# Patient Record
Sex: MALE | Race: OTHER | NOT HISPANIC OR LATINO | Employment: FULL TIME | URBAN - METROPOLITAN AREA
[De-identification: names, ages, dates, MRNs, and addresses within clinical notes are randomized per-mention and may not be internally consistent; named-entity substitution may affect disease eponyms.]

---

## 2023-12-27 ENCOUNTER — TELEPHONE (OUTPATIENT)
Dept: SLEEP MEDICINE | Facility: HOSPITAL | Age: 43
End: 2023-12-27
Payer: COMMERCIAL

## 2023-12-27 DIAGNOSIS — G47.33 OSA (OBSTRUCTIVE SLEEP APNEA): ICD-10-CM

## 2023-12-27 NOTE — TELEPHONE ENCOUNTER
Patient called and was able to get a DME that his insurance covers. Patient states it is [employer]. Patient requesting we send over all information to [facility] so he can begin using CPAP.    Patient scheduled for follow up visit with Dr. Ugalde on 04/11/2024.

## 2024-04-11 ENCOUNTER — APPOINTMENT (OUTPATIENT)
Dept: SLEEP MEDICINE | Facility: HOSPITAL | Age: 44
End: 2024-04-11
Payer: COMMERCIAL

## 2024-04-12 ENCOUNTER — OFFICE VISIT (OUTPATIENT)
Dept: PRIMARY CARE | Facility: CLINIC | Age: 44
End: 2024-04-12
Payer: COMMERCIAL

## 2024-04-12 VITALS
HEIGHT: 70 IN | HEART RATE: 75 BPM | BODY MASS INDEX: 21.5 KG/M2 | DIASTOLIC BLOOD PRESSURE: 73 MMHG | WEIGHT: 150.2 LBS | OXYGEN SATURATION: 97 % | SYSTOLIC BLOOD PRESSURE: 117 MMHG

## 2024-04-12 DIAGNOSIS — H60.02 BOIL, EAR, LEFT: Primary | ICD-10-CM

## 2024-04-12 PROCEDURE — 1036F TOBACCO NON-USER: CPT | Performed by: INTERNAL MEDICINE

## 2024-04-12 PROCEDURE — 99213 OFFICE O/P EST LOW 20 MIN: CPT | Performed by: INTERNAL MEDICINE

## 2024-04-12 RX ORDER — SULFAMETHOXAZOLE AND TRIMETHOPRIM 800; 160 MG/1; MG/1
1 TABLET ORAL 2 TIMES DAILY
Qty: 20 TABLET | Refills: 0 | Status: SHIPPED | OUTPATIENT
Start: 2024-04-12 | End: 2024-04-22

## 2024-04-12 RX ORDER — PREDNISONE 20 MG/1
TABLET ORAL
Qty: 15 TABLET | Refills: 0 | Status: SHIPPED | OUTPATIENT
Start: 2024-04-12 | End: 2024-05-01 | Stop reason: WASHOUT

## 2024-04-12 ASSESSMENT — ENCOUNTER SYMPTOMS: CONSTITUTIONAL NEGATIVE: 1

## 2024-04-12 ASSESSMENT — PATIENT HEALTH QUESTIONNAIRE - PHQ9
SUM OF ALL RESPONSES TO PHQ9 QUESTIONS 1 AND 2: 0
2. FEELING DOWN, DEPRESSED OR HOPELESS: NOT AT ALL
SUM OF ALL RESPONSES TO PHQ9 QUESTIONS 1 AND 2: 0
1. LITTLE INTEREST OR PLEASURE IN DOING THINGS: NOT AT ALL
2. FEELING DOWN, DEPRESSED OR HOPELESS: NOT AT ALL
1. LITTLE INTEREST OR PLEASURE IN DOING THINGS: NOT AT ALL

## 2024-04-12 NOTE — PROGRESS NOTES
"Patient ID: Alistair Chadwick Jr. is a 43 y.o. male who presents for Ear Fullness (Left ear feels full).    /73   Pulse 75   Ht 1.772 m (5' 9.75\")   Wt 68.1 kg (150 lb 3.2 oz)   SpO2 97%   BMI 21.71 kg/m²     HPI      I AM HAVING LEFT EAR FEELS CLOGGED   HAVING LEFT EAR DISCHARGE AND PAIN     NEVER HAD EAR ISSUE IN THE PAST     Subjective     Review of Systems   Constitutional: Negative.    HENT:  Positive for ear pain.    All other systems reviewed and are negative.      Objective     Physical Exam  Vitals and nursing note reviewed.   HENT:      Left Ear: Ear canal normal.      Ears:      Comments: THERE IS A BOIL   VERY TENDER   TO TOUCH WITH THE   AUROSCOPE           Lab Results   Component Value Date    WBC 7.1 06/08/2022    HGB 15.3 06/08/2022    HCT 47.8 12/16/2022    MCV 90 06/08/2022     06/08/2022           Problem List Items Addressed This Visit    None  Visit Diagnoses       Boil, ear, left    -  Primary    Relevant Medications    predniSONE (Deltasone) 20 mg tablet    sulfamethoxazole-trimethoprim (Bactrim DS) 800-160 mg tablet              A/P       BACTRIM DS 1 PILL BID FOR 10 DAYS   ADVISED NOT TO POKE YOUR EAR EITHER   WITH FINGER OT Q TIPS   PREDNISONE 20MG 1 PILL IN AM AND HALF PILL   IN PM TO TAKE WITH FOOD AND 1 GLASS OF WATER FOR 10 DAYS   FOLLOW UP WITH ME IN 1 WK TIME FOR PHYSICAL AND TO CHECK EAR ISSUE      "

## 2024-04-18 ENCOUNTER — OFFICE VISIT (OUTPATIENT)
Dept: PRIMARY CARE | Facility: CLINIC | Age: 44
End: 2024-04-18
Payer: COMMERCIAL

## 2024-04-18 ENCOUNTER — APPOINTMENT (OUTPATIENT)
Dept: PRIMARY CARE | Facility: CLINIC | Age: 44
End: 2024-04-18
Payer: COMMERCIAL

## 2024-04-18 VITALS
DIASTOLIC BLOOD PRESSURE: 52 MMHG | OXYGEN SATURATION: 97 % | HEART RATE: 74 BPM | SYSTOLIC BLOOD PRESSURE: 96 MMHG | WEIGHT: 147 LBS | BODY MASS INDEX: 21.24 KG/M2

## 2024-04-18 DIAGNOSIS — H60.02 BOIL, EAR, LEFT: Primary | ICD-10-CM

## 2024-04-18 PROCEDURE — 99212 OFFICE O/P EST SF 10 MIN: CPT | Performed by: INTERNAL MEDICINE

## 2024-04-18 PROCEDURE — 1036F TOBACCO NON-USER: CPT | Performed by: INTERNAL MEDICINE

## 2024-04-18 ASSESSMENT — ENCOUNTER SYMPTOMS: CONSTITUTIONAL NEGATIVE: 1

## 2024-04-18 NOTE — PROGRESS NOTES
Patient ID: Alistair Chadwick Jr. is a 43 y.o. male who presents for Follow-up (Left ear boil).    BP 96/52   Pulse 74   Wt 66.7 kg (147 lb)   SpO2 97%   BMI 21.24 kg/m²     HPI        PT IS HERE FOR 1 WK F/U , TO EVALUATE FOR LEFT EAR BOIL   PT PRESCRIBED BACTRIM DS 1 PILL BID FOR 10 DAYS   AND PREDNISONE 20MG 1 PILL IN AM AND HALF PILL IN PM   FOR 10 DAYS     HE IS DOING MUCH BETTER   Subjective     Review of Systems   Constitutional: Negative.    HENT:  Positive for ear pain.         LEFT EAR PAIN SIGNIFICANTLY IMPROVED    All other systems reviewed and are negative.      Objective     Physical Exam  Vitals and nursing note reviewed.   HENT:      Left Ear: Ear canal normal.      Ears:      Comments: BOIL IS MUCH IMPROVED   NO REDNESS   AND SWELLING GONE DOWN SIGNIFICANTLY         Lab Results   Component Value Date    WBC 7.1 06/08/2022    HGB 15.3 06/08/2022    HCT 47.8 12/16/2022    MCV 90 06/08/2022     06/08/2022           Problem List Items Addressed This Visit       Boil, ear, left - Primary           A/P         FINISH THE BACTRIM   FINISH THE PREDNISONE   ADVISED TO AVOID ANY POKING   WITH FINGER AND WITH QTIP   WILL SEE HIM FOR HIS PHYSICAL   SCHEDULED ON 04/26/2024

## 2024-04-26 ENCOUNTER — OFFICE VISIT (OUTPATIENT)
Dept: PRIMARY CARE | Facility: CLINIC | Age: 44
End: 2024-04-26
Payer: COMMERCIAL

## 2024-04-26 ENCOUNTER — LAB (OUTPATIENT)
Dept: LAB | Facility: LAB | Age: 44
End: 2024-04-26
Payer: COMMERCIAL

## 2024-04-26 VITALS
WEIGHT: 145.8 LBS | OXYGEN SATURATION: 97 % | BODY MASS INDEX: 21.59 KG/M2 | HEIGHT: 69 IN | HEART RATE: 83 BPM | DIASTOLIC BLOOD PRESSURE: 68 MMHG | SYSTOLIC BLOOD PRESSURE: 115 MMHG

## 2024-04-26 DIAGNOSIS — H60.02 FURUNCLE OF LEFT EAR: ICD-10-CM

## 2024-04-26 DIAGNOSIS — Z00.00 ANNUAL PHYSICAL EXAM: ICD-10-CM

## 2024-04-26 DIAGNOSIS — Z12.5 SCREENING FOR PROSTATE CANCER: ICD-10-CM

## 2024-04-26 DIAGNOSIS — K63.5 HYPERPLASTIC COLONIC POLYP, UNSPECIFIED PART OF COLON: ICD-10-CM

## 2024-04-26 DIAGNOSIS — Z00.00 ANNUAL PHYSICAL EXAM: Primary | ICD-10-CM

## 2024-04-26 LAB
ALBUMIN SERPL BCP-MCNC: 4.2 G/DL (ref 3.4–5)
ALP SERPL-CCNC: 52 U/L (ref 33–120)
ALT SERPL W P-5'-P-CCNC: 24 U/L (ref 10–52)
ANION GAP SERPL CALC-SCNC: 12 MMOL/L (ref 10–20)
AST SERPL W P-5'-P-CCNC: 23 U/L (ref 9–39)
BILIRUB SERPL-MCNC: 0.8 MG/DL (ref 0–1.2)
BUN SERPL-MCNC: 22 MG/DL (ref 6–23)
CALCIUM SERPL-MCNC: 9.4 MG/DL (ref 8.6–10.6)
CHLORIDE SERPL-SCNC: 101 MMOL/L (ref 98–107)
CHOLEST SERPL-MCNC: 181 MG/DL (ref 0–199)
CHOLESTEROL/HDL RATIO: 2.9
CO2 SERPL-SCNC: 33 MMOL/L (ref 21–32)
CREAT SERPL-MCNC: 0.87 MG/DL (ref 0.5–1.3)
EGFRCR SERPLBLD CKD-EPI 2021: >90 ML/MIN/1.73M*2
GLUCOSE SERPL-MCNC: 87 MG/DL (ref 74–99)
HDLC SERPL-MCNC: 61.8 MG/DL
LDLC SERPL CALC-MCNC: 69 MG/DL
NON HDL CHOLESTEROL: 119 MG/DL (ref 0–149)
POTASSIUM SERPL-SCNC: 4.8 MMOL/L (ref 3.5–5.3)
PROT SERPL-MCNC: 6.3 G/DL (ref 6.4–8.2)
PSA SERPL-MCNC: 0.88 NG/ML
SODIUM SERPL-SCNC: 141 MMOL/L (ref 136–145)
TRIGL SERPL-MCNC: 251 MG/DL (ref 0–149)
VLDL: 50 MG/DL (ref 0–40)

## 2024-04-26 PROCEDURE — 36415 COLL VENOUS BLD VENIPUNCTURE: CPT

## 2024-04-26 PROCEDURE — 99396 PREV VISIT EST AGE 40-64: CPT | Performed by: INTERNAL MEDICINE

## 2024-04-26 PROCEDURE — 1036F TOBACCO NON-USER: CPT | Performed by: INTERNAL MEDICINE

## 2024-04-26 PROCEDURE — 80061 LIPID PANEL: CPT

## 2024-04-26 PROCEDURE — 80053 COMPREHEN METABOLIC PANEL: CPT

## 2024-04-26 PROCEDURE — 84153 ASSAY OF PSA TOTAL: CPT

## 2024-04-26 ASSESSMENT — PATIENT HEALTH QUESTIONNAIRE - PHQ9
1. LITTLE INTEREST OR PLEASURE IN DOING THINGS: NOT AT ALL
SUM OF ALL RESPONSES TO PHQ9 QUESTIONS 1 AND 2: 0
2. FEELING DOWN, DEPRESSED OR HOPELESS: NOT AT ALL

## 2024-04-26 NOTE — PROGRESS NOTES
"Patient ID: Alistair Chadwick Jr. is a 43 y.o. male who presents for Annual Exam.    /68   Pulse 83   Ht 1.753 m (5' 9\")   Wt 66.1 kg (145 lb 12.8 oz)   SpO2 97%   BMI 21.53 kg/m²     HPI      I am having lt  ear pain   He had 2 separate course of ABX   Got better     He noted pain  and slight swelling   Inside the Auditory canal , it appears he   Is having recurrent boil /folliculitis       Pt has hx of hyperplastic colon polyps   Done twice at [place] , no blood in stool   Bowel normal, no change in bowel habit     No  FHX o ca colon     Subjective     Review of Systems   HENT:  Positive for ear pain.         Left ear pain    All other systems reviewed and are negative.      Objective     Physical Exam  Vitals and nursing note reviewed.   HENT:      Left Ear: Tympanic membrane normal.      Ears:      Comments: Left auditory canal furuncle noted   Neck:      Vascular: No carotid bruit.   Cardiovascular:      Rate and Rhythm: Normal rate and regular rhythm.      Pulses: Normal pulses.      Heart sounds: Normal heart sounds.   Pulmonary:      Effort: Pulmonary effort is normal.      Breath sounds: Normal breath sounds.   Lymphadenopathy:      Cervical: No cervical adenopathy.         Lab Results   Component Value Date    WBC 7.1 06/08/2022    HGB 15.3 06/08/2022    HCT 47.8 12/16/2022    MCV 90 06/08/2022     06/08/2022           Problem List Items Addressed This Visit       Hyperplastic colonic polyp    Relevant Orders    Colonoscopy Screening; Average Risk Patient    Furuncle of left ear     Other Visit Diagnoses       Annual physical exam    -  Primary    Relevant Orders    Comprehensive metabolic panel    Lipid panel    Screening for prostate cancer        Relevant Orders    Prostate Spec.Ag,Screen                 A/P         OFFERED  ABX PT DEFFERED   PT HAS APPT WITH ENT   REFFERAL COLONOSCOPY   CMP,LIPID,PSA   FOLLOW UP IF NOTED   "

## 2024-04-29 DIAGNOSIS — H91.92 DECREASED HEARING OF LEFT EAR: Primary | ICD-10-CM

## 2024-04-30 ENCOUNTER — CLINICAL SUPPORT (OUTPATIENT)
Dept: AUDIOLOGY | Facility: CLINIC | Age: 44
End: 2024-04-30
Payer: COMMERCIAL

## 2024-04-30 DIAGNOSIS — H93.12 TINNITUS, LEFT: ICD-10-CM

## 2024-04-30 DIAGNOSIS — H93.8X2 FULLNESS IN EAR, LEFT: Primary | ICD-10-CM

## 2024-04-30 DIAGNOSIS — H92.12 OTORRHEA, LEFT: ICD-10-CM

## 2024-04-30 PROCEDURE — 92567 TYMPANOMETRY: CPT

## 2024-04-30 PROCEDURE — 92557 COMPREHENSIVE HEARING TEST: CPT

## 2024-04-30 ASSESSMENT — PAIN - FUNCTIONAL ASSESSMENT: PAIN_FUNCTIONAL_ASSESSMENT: 0-10

## 2024-04-30 ASSESSMENT — PAIN SCALES - GENERAL: PAINLEVEL_OUTOF10: 0 - NO PAIN

## 2024-05-01 ENCOUNTER — OFFICE VISIT (OUTPATIENT)
Dept: OTOLARYNGOLOGY | Facility: HOSPITAL | Age: 44
End: 2024-05-01
Payer: COMMERCIAL

## 2024-05-01 DIAGNOSIS — H61.899 DEBRIS IN EAR CANAL: Primary | ICD-10-CM

## 2024-05-01 PROCEDURE — 1036F TOBACCO NON-USER: CPT | Performed by: NURSE PRACTITIONER

## 2024-05-01 PROCEDURE — 99213 OFFICE O/P EST LOW 20 MIN: CPT | Performed by: NURSE PRACTITIONER

## 2024-05-01 PROCEDURE — 99203 OFFICE O/P NEW LOW 30 MIN: CPT | Performed by: NURSE PRACTITIONER

## 2024-05-01 RX ORDER — TADALAFIL 5 MG/1
TABLET ORAL
COMMUNITY
Start: 2023-01-09

## 2024-05-01 RX ORDER — PHENYLPROPANOLAMINE/CLEMASTINE 75-1.34MG
TABLET, EXTENDED RELEASE ORAL
COMMUNITY

## 2024-05-01 RX ORDER — MULTIVITAMIN/IRON/FOLIC ACID 18MG-0.4MG
TABLET ORAL
COMMUNITY

## 2024-05-01 ASSESSMENT — PATIENT HEALTH QUESTIONNAIRE - PHQ9
1. LITTLE INTEREST OR PLEASURE IN DOING THINGS: NOT AT ALL
2. FEELING DOWN, DEPRESSED OR HOPELESS: NOT AT ALL
SUM OF ALL RESPONSES TO PHQ9 QUESTIONS 1 & 2: 0

## 2024-05-01 ASSESSMENT — ENCOUNTER SYMPTOMS
LOSS OF SENSATION IN FEET: 0
DEPRESSION: 0
OCCASIONAL FEELINGS OF UNSTEADINESS: 0

## 2024-05-01 NOTE — LETTER
May 1, 2024     Patient: Alistair Chadwick   YOB: 1980   Date of Visit: 5/1/2024       To Whom It May Concern:    Alistair Chadwick was seen in my clinic on 5/1/2024 at 11:40 am. Please excuse Alistair for his absence from work on this day to make the appointment.    If you have any questions or concerns, please don't hesitate to call.         Sincerely,         Arlyn Sweet, APRN-CNP        CC: No Recipients

## 2024-05-01 NOTE — PROGRESS NOTES
AUDIOLOGIC EVALUATION      Name:  Alistair Chadwick  :  1980  Age:  43 y.o.  Date of Evaluation:  2024    Time: 8298-7514    HISTORY:  Alistair Chadwick, 43 y.o., was seen for an audiologic assessment. He reported the onset of left otorrhea, aural fullness, pulsatile tinnitus, and reduced hearing sensitivity in March. He was prescribed several rounds of antibiotics which resolved his otologic symptoms. However, the left otologic symptoms return once the antibiotics are completed. He noted that previous medical evaluation revealed an infected hair follicle that may be the cause of his issue. He denied otalgia, dizziness, history of noise exposure, history of otologic surgeries, family history of congenital hearing loss, and recent falls.       RESULTS:  Otoscopic Evaluation:  Right Ear: Unremarkable  Left Ear: Dried drainage on tympanic membrane    Immittance Measures:  Right Ear: Type A -- indicating normal volume, pressure, and static compliance  Left Ear: Type As -- indicating normal volume, pressure, and reduced tympanic membrane compliance    Acoustic Reflexes:  Right Ear: Could not test as hermetic seal could not be maintained.  Left Ear: Could not test as hermetic seal could not be maintained.    Pure Tone Audiometry:    Right Ear: Hearing within normal limits 125-8000 Hz  Left Ear: Hearing within normal limits 125-8000 Hz, note conductive component at 4000 Hz    Asymmetry: Yes, left ear poorer 4000 and 6000 Hz    No previous audiogram for comparison.    Reliability:   Good    Speech Audiometry:   Right: Speech Reception Threshold (SRT) was obtained at 10 dBHL.   SRT/PTA in Good agreement with pure tone average.    Left: Speech Reception Threshold (SRT) was obtained at 0 dBHL.   SRT/PTA in Good agreement with pure tone average.    Word Recognition Scores (WRS):  Right Ear: excellent (100%) in quiet when words were presented at 50 dBHL.   Left Ear: excellent (100%) in quiet when words were presented at  50 dBHL.     Asymmetry: No      IMPRESSIONS:  Today's testing revealed normal ear canal volume, middle ear pressure, and tympanic membrane compliance in the right ear. He has normal ear canal volume, middle ear pressure, and reduced tympanic membrane compliance in the left ear. He has hearing within normal limits and excellent word recognition scores in both ears. Although, he has a conductive component at 4000 Hz in the left ear and an asymmetry (left ear poorer), likely due to the dried drainage on the tympanic membrane. The results were discussed with the patient. He should follow-up with ENT as scheduled for further evaluation of his persistent otologic symptoms, left ear conductive component, and noted asymmetry.       RECOMMENDATIONS:  1.) Continue medical follow up with Ears Nose and Throat (ENT) provider as recommended.  2.) Return for audiologic evaluation in conjunction with medical management to monitor hearing sensitivity and assess middle ear status or sooner should concerns arise.      Analia Brito, CCC-A  Clinical Audiologist        KEY  SNHL Sensorineural Hearing Loss   CHL Conductive Hearing Loss   MHL Mixed Hearing Loss   SSNHL Sudden Sensorineural Hearing Loss   WNL Within Normal Limits   PTA Pure Tone Average   TM Tympanic Membrane   ECV Ear Canal Volume   SRT Speech Reception Threshold   WRS Word Recognition Score

## 2024-05-01 NOTE — PROGRESS NOTES
24  Alistair Chadwick is a 43 y.o. year old male patient with   referred for  .      : 1980    HPI:  Alistair Chadwick is a 43-year-old who is coming to be seen for recent ear infections.  Patient states in early 2024 reduced hearing, drainage and a feeling of fullness in his left ear.  He was seen in urgent care where he was prescribed antibiotics; completion of course symptoms did improve if not resolved.  H however the symptoms did return rather quickly.  He was seen by his PCP who did prescribe another round of antibiotics along with steroids.  Upon the exam with his PCP it was noted that his symptoms may be related to an infected hair follicle.  He was then recommended to see an ENT.  Prior to today's exam he did undergo audiogram which did express normal hearing.  Please see audiology report for more detailed findings.    ROS:  Unless mentioned in the HPI, all other systems have been reviewed and ar negative.      Physical Exam:  Well appearing individual in no acute distress.  No stertor and no Stridor.  Good voice.  No LAD.  Skin is soft and supple. Oral cavity and oropharynx are without lesions.  No thyromegaly.  Anterior rhinoscopy reveals normal nasal mucosa bilaterally.  Right EAC normal AU with YAMILETH. Left EAC with multiple hard crusted cerumen/debris. Cranial Nerves grossly intact.    Audiogram Impression 2024:  Testing revealed normal ear canal volume, middle ear pressure, and tympanic membrane compliance in the right ear. He has normal ear canal volume, middle ear pressure, and reduced tympanic membrane compliance in the left ear. He has hearing within normal limits and excellent word recognition scores in both ears. Although, he has a conductive component at 4000 Hz in the left ear and an asymmetry (left ear poorer), likely due to the dried drainage on the tympanic membrane. The results were discussed with the patient. He should follow-up with ENT as scheduled for further evaluation  of his persistent otologic symptoms, left ear conductive component, and noted asymmetry.       Assessment:   43-year-old male with dried/crusted cerumen/debris in left ear canal.  Patient has been referred to my colleague Smooth Stevenson CNP for removal of crusted debris due to the amount of crusting. Removal at my clinic today would be extremely painful due to how tightly adhered things are.     -Instructed to use a few drops of baby oil tonight and tomorrow morning to help soften  and prepare for removal.  -Follow up with OCTAVIA Stevenson at 2PM on 5/2/2024 at [place].    VALENTINA Ballesteros-OCTAVIA          Ectopic pregnancy

## 2024-05-01 NOTE — LETTER
May 1, 2024     Patient: Alistair Chadwick   YOB: 1980   Date of Visit: 5/1/2024       To Whom It May Concern:    Alistair Chadwick will have an appointment with EVELYNE Stevenson, nurse practitioner, for  additional ear procedure on  5/2/2024 at 2:00PM. Please excuse Alistair for his absence from work on this day to make his appointment.    If you have any questions or concerns, please don't hesitate to call.         Sincerely,         Arlyn Sweet, VALENTINA-CNP        CC: No Recipients

## 2024-05-01 NOTE — PATIENT INSTRUCTIONS
Licha Matamoros to the clinic of Arlyn Sweet CNP. We are here to assist you through your ENT care at [facility].    My office number is [phone]. This number is the most direct way to communicate with the office. Dalila is my  and she answers the office phone from 8am-4pm Mon-Fri. She can help you with many general questions and information. Questions that she cannot answer will be directed appropriately to me. You may need to leave a message. In this case, someone from the team will call you back.    Sometimes other team members will also be involved in your care. This may include dieticians, social workers, speech therapists, medical oncologist or radiation oncologists. I will provide these referrals as needed. Please let me know if you would like to request a specific referral.    I look forward to working with you to meet your healthcare goals.     Plan:  -Instructed to use a few drops of baby oil tonight and tomorrow morning to help soften  and prepare for removal.  -Follow up with OCTAVIA Stevenson at 2PM on 5/2/2024 at [place].

## 2024-05-02 ENCOUNTER — OFFICE VISIT (OUTPATIENT)
Dept: OTOLARYNGOLOGY | Facility: CLINIC | Age: 44
End: 2024-05-02
Payer: COMMERCIAL

## 2024-05-02 VITALS — WEIGHT: 146.4 LBS | HEIGHT: 70 IN | BODY MASS INDEX: 20.96 KG/M2 | TEMPERATURE: 97.8 F

## 2024-05-02 DIAGNOSIS — H61.22 IMPACTED CERUMEN OF LEFT EAR: Primary | ICD-10-CM

## 2024-05-02 PROCEDURE — 1036F TOBACCO NON-USER: CPT | Performed by: NURSE PRACTITIONER

## 2024-05-02 PROCEDURE — 69210 REMOVE IMPACTED EAR WAX UNI: CPT | Performed by: NURSE PRACTITIONER

## 2024-05-02 ASSESSMENT — PATIENT HEALTH QUESTIONNAIRE - PHQ9
1. LITTLE INTEREST OR PLEASURE IN DOING THINGS: NOT AT ALL
2. FEELING DOWN, DEPRESSED OR HOPELESS: NOT AT ALL
SUM OF ALL RESPONSES TO PHQ9 QUESTIONS 1 AND 2: 0

## 2024-05-02 NOTE — PROGRESS NOTES
Subjective   Patient ID: Alistair Chadwick is a 43 y.o. male who presents for left ear blockage.  HPI  Left ear cleaning only. Used debrox yesterday.  Referred by Angelica Sweet CNP.  Patient reports that he has had ear infection and after everything healed he had residual debris in the ear canal.  He saw Tanesha and yesterday and was diagnosed with adherent cerumen debris in the canal.  Removing the debris yesterday would have posed a risk for bleeding therefore patient was advised to use Debrox drops and then follow-up today for ear cleaning.      Review of system negative except for mentioned in the HPI.  Objective   Physical Exam  CONSTITUTIONAL: No acute distress, normal facial features; No fever; no chills  VOICE: No hoarseness or other audible abnormality  RESPIRATION: Breathing comfortably, no stridor; normal breathing effort  CV: No cyanosis visible on the face and neck area  EYES:Pupils equal and round ; no erythema; conjunctiva clear; sclera white  NEURO: Alert and oriented, able to raise eyebrows symmetrical bilateral, smile with no facial droop, able to swallow  HEAD AND FACE: Symmetric facial features, no masses or lesions    Right ear examination: External ear normal.  EAC is clear.  TM intact with no effusion, retraction or perforation.  Left ear examination: External ear normal.  EAC with jack-colored cerumen adherent to the TM.    NOSE: External nose midline  ORAL CAVITY: No lesions of external lips; uvula is midline; tongue with good mobility; no gross mass in oral cavity; mucosa appears pink   NECK/LYMPH: No obvious deformity or lesions; trachea is midline  PSYCH: Alert and oriented with appropriate mood and affect.    Patient ID: Alistair Chadwick is a 43 y.o. male.    Procedures  Using microscope, #4 speculum, right ankle follow-up, alligator and #7 suction the cerumen was removed from the ear canal.  After cerumen removal tympanic membrane was inspected appears to be intact with no effusion,  retraction or perforation.      Assessment/Plan       1. Impacted cerumen of left ear          Cerumen removal was performed today.  Patient tolerated procedure well.  After cleaning patient stated that he can hear better.  Advised to follow-up in 6 months for ear check and repeat ear cleaning.    This note was created using speech recognition transcription software. Despite proofreading, several typographical errors might be present that might affect the meaning of the content. Please call with any questions.  .         VALENTINA Sweeney-CNP 05/02/24 3:49 PM

## 2024-07-17 ENCOUNTER — TELEPHONE (OUTPATIENT)
Dept: SLEEP MEDICINE | Facility: HOSPITAL | Age: 44
End: 2024-07-17
Payer: COMMERCIAL

## 2024-07-17 NOTE — TELEPHONE ENCOUNTER
Pt having problems with PAP supplies via [employer], not covered by insurance. Called [employer] and sent over previous H&P prior to sleep study as requested.

## 2024-07-25 ENCOUNTER — APPOINTMENT (OUTPATIENT)
Dept: AUDIOLOGY | Facility: CLINIC | Age: 44
End: 2024-07-25
Payer: COMMERCIAL

## 2024-07-25 ENCOUNTER — APPOINTMENT (OUTPATIENT)
Dept: OTOLARYNGOLOGY | Facility: CLINIC | Age: 44
End: 2024-07-25
Payer: COMMERCIAL

## 2024-08-14 DIAGNOSIS — K63.5 POLYP OF COLON, UNSPECIFIED PART OF COLON, UNSPECIFIED TYPE: Primary | ICD-10-CM

## 2024-08-14 RX ORDER — POLYETHYLENE GLYCOL 3350, SODIUM SULFATE ANHYDROUS, SODIUM BICARBONATE, SODIUM CHLORIDE, POTASSIUM CHLORIDE 236; 22.74; 6.74; 5.86; 2.97 G/4L; G/4L; G/4L; G/4L; G/4L
POWDER, FOR SOLUTION ORAL
Qty: 4000 ML | Refills: 0 | Status: SHIPPED | OUTPATIENT
Start: 2024-08-14

## 2024-08-22 ENCOUNTER — APPOINTMENT (OUTPATIENT)
Dept: GASTROENTEROLOGY | Facility: EXTERNAL LOCATION | Age: 44
End: 2024-08-22
Payer: COMMERCIAL

## 2024-08-22 ENCOUNTER — APPOINTMENT (OUTPATIENT)
Dept: SLEEP MEDICINE | Facility: HOSPITAL | Age: 44
End: 2024-08-22
Payer: COMMERCIAL

## 2024-08-29 ENCOUNTER — OFFICE VISIT (OUTPATIENT)
Dept: SLEEP MEDICINE | Facility: HOSPITAL | Age: 44
End: 2024-08-29
Payer: COMMERCIAL

## 2024-08-29 VITALS
HEART RATE: 88 BPM | BODY MASS INDEX: 21.98 KG/M2 | TEMPERATURE: 98 F | OXYGEN SATURATION: 99 % | WEIGHT: 151 LBS | DIASTOLIC BLOOD PRESSURE: 84 MMHG | SYSTOLIC BLOOD PRESSURE: 123 MMHG

## 2024-08-29 DIAGNOSIS — G47.33 OSA (OBSTRUCTIVE SLEEP APNEA): Primary | ICD-10-CM

## 2024-08-29 DIAGNOSIS — Z72.820 SLEEP DEPRIVATION: ICD-10-CM

## 2024-08-29 DIAGNOSIS — F51.12 INSUFFICIENT SLEEP SYNDROME: ICD-10-CM

## 2024-08-29 PROCEDURE — 1036F TOBACCO NON-USER: CPT | Performed by: STUDENT IN AN ORGANIZED HEALTH CARE EDUCATION/TRAINING PROGRAM

## 2024-08-29 PROCEDURE — 99214 OFFICE O/P EST MOD 30 MIN: CPT | Performed by: STUDENT IN AN ORGANIZED HEALTH CARE EDUCATION/TRAINING PROGRAM

## 2024-08-29 PROCEDURE — 99214 OFFICE O/P EST MOD 30 MIN: CPT | Mod: GC | Performed by: STUDENT IN AN ORGANIZED HEALTH CARE EDUCATION/TRAINING PROGRAM

## 2024-08-29 ASSESSMENT — LIFESTYLE VARIABLES
AUDIT-C TOTAL SCORE: 3
SKIP TO QUESTIONS 9-10: 1
HOW OFTEN DO YOU HAVE A DRINK CONTAINING ALCOHOL: 2-3 TIMES A WEEK
HOW MANY STANDARD DRINKS CONTAINING ALCOHOL DO YOU HAVE ON A TYPICAL DAY: 1 OR 2
HOW OFTEN DO YOU HAVE SIX OR MORE DRINKS ON ONE OCCASION: NEVER

## 2024-08-29 ASSESSMENT — PAIN SCALES - GENERAL: PAINLEVEL: 0-NO PAIN

## 2024-08-29 NOTE — PROGRESS NOTES
Patient: Alistair Chdawick    [id number]  : 1980 -- AGE 44 y.o.    Provider: Florence Leal MD     Location [facility]   Service Date: 2024              [facility]  Followup Visit Note      HISTORY OF PRESENT ILLNESS     The patient's referring provider is: No ref. provider found    HISTORY OF PRESENT ILLNESS   Alistair Chadwick is a 44 y.o. male who presents to a [facility] for followup.     The patient  has a past medical history of Personal history of colonic polyps, Personal history of other diseases of the musculoskeletal system and connective tissue, and Personal history of other diseases of the nervous system and sense organs..    PAST SLEEP HISTORY  HSAT showed mod FRANKLIN with AHI ~ 25; very positional. significant apneic episodes when supine  Ordered APAP 6-13 cwp via [facility], educated pt on FRANKLIN and PAP therapy education as well as the tips to be successful with PAP treatment. Pt was lost to follow up.   Pt sleep 5-6 hours per night and has high ESS score. Encouraged to increase sleep total to 7-9hrs a night    CURRENT HISTORY    On today's visit, the patient reports he is not using his PAP yet. He was able to obtain a machine and supplies through [employer] in 2024 but has not turned on the machine yet. He reports he has been very busy with home and work obligations. He continues to allow himself to sleep 5-6 hours per night, his ESS is 24/24. He drinks a lot of caffeinated drinks to stay alert.     Sleep Schedule: He goes to bed at 12 and his sleep latency is 10min. He wakes by  5am. He has 0 awakenings per night that last for 0 minutes. He gets about 5 hours of sleep per night.    PAP Adherence:  DURABLE MEDICAL EQUIPMENT COMPANY: [employer]  Machine: THERAPY: AUTO-PAP PRESSURE SETTINGS: 5 - 15 cm H2O  Mask: unknown  Issues with therapy: ISSUES WITH THERAPY: n/a  Benefits with PAP: n/a      RLS Followup:   SPPRLSFUSX:  none.      Daytime Symptoms    Patient report some daytime symptoms including: DAYTIME SYMPTOMS: excessive daytime sleepiness irritability during the day difficulty with memory or concentration during the day feeling sleepy when driving fatigue especially towards afternoon    Naps:   no.   Fatigue: symptoms bothersome, but easily able to carry out all usual work/school/family activities    ESS: 24/24  LUCY 14/28  FOSQ 26      REVIEW OF SYSTEMS     REVIEW OF SYSTEMS  +fatigue, sleepiness  Denies multiple awakenings    ALLERGIES AND MEDICATIONS     ALLERGIES  No Known Allergies    MEDICATIONS: He has a current medication list which includes the following prescription(s): ibuprofen - Take by mouth, centrum - Take by mouth, polyethylene glycol - Drink 1/2 starting at 6 pm the night before your procedure then drink the 2nd 1/2 5 hours before procedure arrival time, and tadalafil - Take by mouth.    PAST MEDICAL HISTORY : He  has a past medical history of Personal history of colonic polyps, Personal history of other diseases of the musculoskeletal system and connective tissue, and Personal history of other diseases of the nervous system and sense organs.    PAST SURGICAL HISTORY: He  has a past surgical history that includes Other surgical history (06/08/2022) and Other surgical history (06/08/2022).     FAMILY HISTORY: No changes since previous visit. Otherwise non-contributory as charted.       SOCIAL HISTORY  He  reports that he has never smoked. He has never used smokeless tobacco. He reports that he does not currently use alcohol after a past usage of about 14.0 standard drinks of alcohol per week. He reports that he does not use drugs.       PHYSICAL EXAM     VITAL SIGNS: /84   Pulse 88   Temp 36.7 °C (98 °F)   Wt 68.5 kg (151 lb)   SpO2 99% Comment: TANJA  BMI 21.98 kg/m²      CURRENT WEIGHT: [unfilled]  BMI: [unfilled]  PREVIOUS WEIGHTS:  Wt Readings from Last 3 Encounters:   08/29/24 68.5 kg (151 lb)   05/02/24  "66.4 kg (146 lb 6.4 oz)   04/26/24 66.1 kg (145 lb 12.8 oz)       Physical Exam  General: well appearing, no acute distress  ENT: No nasopharyngeal edema, turbinate hypertrophy or deviated septum. No retrognathia. No tongue scalloping. Uvula midline. Mallampati 3.  Neck: Supple, soft, no LAD, no thyromegaly  Cardiac: RSR  Resp: normal resp effort  Neuro: alert, gait normal      RESULTS/DATA     No results found for: \"IRON\", \"TRANSFERRIN\", \"IRONSAT\", \"TIBC\", \"FERRITIN\"    PAP Adherence  Has the machine and supplies, Has not started therapy.     ASSESSMENT/PLAN     Mr. Chadwick is a 44 y.o. male and  has a past medical history of Personal history of colonic polyps, Personal history of other diseases of the musculoskeletal system and connective tissue, and Personal history of other diseases of the nervous system and sense organs. He returns in followup to the [facility] for their FRANKLIN. Patient was lost ot follow up since 2022 and has not made any changes to his sleep patterns/sleep hygiene and has not started treatment for CPAP. Counseled on importance of FRANKLIN treatment despite life's stressors and interferences. Agreed to start the CPAP tonight and communicate with us via [employer] 2 weeks after use so we can review his download report. Will call pt if no update.     Problem List and Orders  Problem List Items Addressed This Visit    None  Visit Diagnoses         Codes    FRANKLIN (obstructive sleep apnea)    -  Primary G47.33    Sleep deprivation     Z72.820    Insufficient sleep syndrome     F51.12            Recommendations/Plan:  -Counseled on importance of FRANKLIN treatment despite life's stressors and interferences.   -Agreed to start the CPAP tonight and communicate with us via [employer] 2 weeks after use so we can review his download report. Will call pt if no update.   - Avoid driving when sleepy  -Avoid alcohol  - increase sleep duration by going to bed 2 hours earlier                 "

## 2024-08-29 NOTE — PATIENT INSTRUCTIONS
Start using your CPAP. Call us or send us a message via [employer] to let us know you used the machine for 2 weeks so we can download your data from [employer]. Start going to bed earlier to increase your sleep time. Avoid driving when sleepy.  Follow in clinic with  in 4 months.

## 2024-10-03 ENCOUNTER — TELEPHONE (OUTPATIENT)
Dept: SLEEP MEDICINE | Facility: HOSPITAL | Age: 44
End: 2024-10-03
Payer: COMMERCIAL

## 2024-10-03 NOTE — TELEPHONE ENCOUNTER
Called pt at 1815 today and left a voicemail. Downloaded patient's CPAP compliance report for September when patient started to use it regularly. Excellent response to therapy from respiratory event control standpoint. We would like to discuss his changes in his symptoms and follow up on other technical aspects in the clinic in the near future.

## 2024-10-07 DIAGNOSIS — K63.5 POLYP OF COLON, UNSPECIFIED PART OF COLON, UNSPECIFIED TYPE: ICD-10-CM

## 2024-10-07 RX ORDER — POLYETHYLENE GLYCOL 3350, SODIUM SULFATE ANHYDROUS, SODIUM BICARBONATE, SODIUM CHLORIDE, POTASSIUM CHLORIDE 236; 22.74; 6.74; 5.86; 2.97 G/4L; G/4L; G/4L; G/4L; G/4L
POWDER, FOR SOLUTION ORAL
Qty: 4000 ML | Refills: 0 | Status: SHIPPED | OUTPATIENT
Start: 2024-10-07

## 2024-10-17 ENCOUNTER — APPOINTMENT (OUTPATIENT)
Dept: GASTROENTEROLOGY | Facility: EXTERNAL LOCATION | Age: 44
End: 2024-10-17
Payer: COMMERCIAL

## 2024-10-17 DIAGNOSIS — K63.5 HYPERPLASTIC COLONIC POLYP, UNSPECIFIED PART OF COLON: ICD-10-CM

## 2024-10-17 DIAGNOSIS — D12.2 BENIGN NEOPLASM OF ASCENDING COLON: ICD-10-CM

## 2024-10-17 DIAGNOSIS — D12.3 BENIGN NEOPLASM OF TRANSVERSE COLON: ICD-10-CM

## 2024-10-17 DIAGNOSIS — Z12.11 COLON CANCER SCREENING: Primary | ICD-10-CM

## 2024-10-17 DIAGNOSIS — Z86.0100 HX OF COLONIC POLYPS: ICD-10-CM

## 2024-10-17 PROCEDURE — 45380 COLONOSCOPY AND BIOPSY: CPT | Performed by: INTERNAL MEDICINE

## 2024-10-17 PROCEDURE — 45385 COLONOSCOPY W/LESION REMOVAL: CPT | Performed by: INTERNAL MEDICINE

## 2024-10-18 ENCOUNTER — LAB REQUISITION (OUTPATIENT)
Dept: LAB | Facility: HOSPITAL | Age: 44
End: 2024-10-18
Payer: COMMERCIAL

## 2024-10-18 PROCEDURE — 0753T DGTZ GLS MCRSCP SLD LEVEL IV: CPT

## 2024-10-18 PROCEDURE — 88305 TISSUE EXAM BY PATHOLOGIST: CPT | Performed by: PATHOLOGY

## 2024-10-18 PROCEDURE — 88305 TISSUE EXAM BY PATHOLOGIST: CPT

## 2024-10-28 LAB
LABORATORY COMMENT REPORT: NORMAL
PATH REPORT.FINAL DX SPEC: NORMAL
PATH REPORT.GROSS SPEC: NORMAL
PATH REPORT.RELEVANT HX SPEC: NORMAL
PATH REPORT.TOTAL CANCER: NORMAL

## 2025-01-09 ENCOUNTER — APPOINTMENT (OUTPATIENT)
Dept: SLEEP MEDICINE | Facility: HOSPITAL | Age: 45
End: 2025-01-09
Payer: COMMERCIAL

## 2025-01-09 ENCOUNTER — OFFICE VISIT (OUTPATIENT)
Dept: SLEEP MEDICINE | Facility: HOSPITAL | Age: 45
End: 2025-01-09
Payer: COMMERCIAL

## 2025-01-09 VITALS
OXYGEN SATURATION: 98 % | WEIGHT: 163 LBS | BODY MASS INDEX: 23.73 KG/M2 | TEMPERATURE: 98 F | SYSTOLIC BLOOD PRESSURE: 128 MMHG | HEART RATE: 80 BPM | DIASTOLIC BLOOD PRESSURE: 74 MMHG

## 2025-01-09 DIAGNOSIS — G47.33 OSA (OBSTRUCTIVE SLEEP APNEA): Primary | ICD-10-CM

## 2025-01-09 DIAGNOSIS — F51.12 INSUFFICIENT SLEEP SYNDROME: ICD-10-CM

## 2025-01-09 DIAGNOSIS — Z72.820 SLEEP DEPRIVATION: ICD-10-CM

## 2025-01-09 PROCEDURE — 1036F TOBACCO NON-USER: CPT | Performed by: STUDENT IN AN ORGANIZED HEALTH CARE EDUCATION/TRAINING PROGRAM

## 2025-01-09 PROCEDURE — 99214 OFFICE O/P EST MOD 30 MIN: CPT | Performed by: STUDENT IN AN ORGANIZED HEALTH CARE EDUCATION/TRAINING PROGRAM

## 2025-01-09 ASSESSMENT — PAIN SCALES - GENERAL: PAINLEVEL_OUTOF10: 0-NO PAIN

## 2025-01-09 ASSESSMENT — SLEEP AND FATIGUE QUESTIONNAIRES
HOW LIKELY ARE YOU TO NOD OFF OR FALL ASLEEP WHEN YOU ARE A PASSENGER IN A CAR FOR AN HOUR WITHOUT A BREAK: HIGH CHANCE OF DOZING
HOW LIKELY ARE YOU TO NOD OFF OR FALL ASLEEP WHILE LYING DOWN TO REST IN THE AFTERNOON WHEN CIRCUMSTANCES PERMIT: HIGH CHANCE OF DOZING
SITING INACTIVE IN A PUBLIC PLACE LIKE A CLASS ROOM OR A MOVIE THEATER: SLIGHT CHANCE OF DOZING
HOW LIKELY ARE YOU TO NOD OFF OR FALL ASLEEP WHILE WATCHING TV: MODERATE CHANCE OF DOZING
SLEEP_PROBLEM_NOTICEABLE_TO_OTHERS: MUCH
ESS-CHAD TOTAL SCORE: 17
WORRIED_DISTRESSED_DUE_TO_SLEEP: SOMEWHAT
HOW LIKELY ARE YOU TO NOD OFF OR FALL ASLEEP IN A CAR, WHILE STOPPED FOR A FEW MINUTES IN TRAFFIC: SLIGHT CHANCE OF DOZING
HOW LIKELY ARE YOU TO NOD OFF OR FALL ASLEEP WHILE SITTING AND READING: HIGH CHANCE OF DOZING
HOW LIKELY ARE YOU TO NOD OFF OR FALL ASLEEP WHILE SITTING AND TALKING TO SOMEONE: SLIGHT CHANCE OF DOZING
HOW LIKELY ARE YOU TO NOD OFF OR FALL ASLEEP WHILE SITTING QUIETLY AFTER LUNCH WITHOUT ALCOHOL: HIGH CHANCE OF DOZING
SATISFACTION_WITH_CURRENT_SLEEP_PATTERN: SATISFIED
SLEEP_PROBLEM_INTERFERES_DAILY_ACTIVITIES: MUCH

## 2025-01-09 ASSESSMENT — ENCOUNTER SYMPTOMS
CARDIOVASCULAR NEGATIVE: 1
RESPIRATORY NEGATIVE: 1
CONSTITUTIONAL NEGATIVE: 1
PSYCHIATRIC NEGATIVE: 1
NEUROLOGICAL NEGATIVE: 1

## 2025-01-09 NOTE — PROGRESS NOTES
Patient: Alistair Chadwick    [id number]  : 1980 -- AGE 44 y.o.    Provider: Luis Alberto Ugalde MD     Location [facility]   Service Date: 2025              [facility]  Followup Visit Note        ASSESSMENT/PLAN     Mr. Chadwick is a 44 y.o. male and he returns in followup to the [facility] for the problems listed below on 25     Problem List, Orders, Assessment, Recommendations:  Problem List Items Addressed This Visit             ICD-10-CM    FRANKLIN (obstructive sleep apnea) - Primary G47.33     - doing well with PAP therapy, but could use sleep and usage extension  - continue current setting  - renew PAP supply orders           Relevant Orders    Positive Airway Pressure (PAP) Therapy    Sleep deprivation Z72.820    Insufficient sleep syndrome F51.12     Excessive daytime sleepiness is likely due to lack of sleep    Emphasized the need for adequate sleep          Disposition  Return to clinic in 6 months       HISTORY OF PRESENT ILLNESS     HISTORY OF PRESENT ILLNESS   Alistair Chadwick is a 44 y.o. male with h/o FRANKLIN who presents to a [facility] for followup.     Assessment and plan from last visit: 24    Mr. Chadwick is a 44 y.o. male and  has a past medical history of Personal history of colonic polyps, Personal history of other diseases of the musculoskeletal system and connective tissue, and Personal history of other diseases of the nervous system and sense organs. He returns in followup to the [facility] for their FRANKLIN. Patient was lost ot follow up since  and has not made any changes to his sleep patterns/sleep hygiene and has not started treatment for CPAP. Counseled on importance of FRANKLIN treatment despite life's stressors and interferences. Agreed to start the CPAP tonight and communicate with us via [employer] 2 weeks after use so we can review his  download report. Will call pt if no update.      Problem List and Orders  Problem List Items Addressed This Visit    None  Visit Diagnoses           Codes     FRANKLIN (obstructive sleep apnea)    -  Primary G47.33     Sleep deprivation     Z72.820     Insufficient sleep syndrome     F51.12                Recommendations/Plan:  -Counseled on importance of FRANKLIN treatment despite life's stressors and interferences.   -Agreed to start the CPAP tonight and communicate with us via [employer] 2 weeks after use so we can review his download report. Will call pt if no update.   - Avoid driving when sleepy  -Avoid alcohol  - increase sleep duration by going to bed 2 hours earlier       Current History    On today's visit, the patient reports that he has been feeling better with PAP therapy.  He feels that his sleep quality is better with PAP therapy.  He, however, takes on two jobs and often doesn't go to bed until MN.  His avg sleep time is about 5.5 hr which corresponds to his usage on CPAP.  He has kids and a lot of stuff that needs to be done, and may be the reason why he doesn't get enough sleep.    PAP Info  DURABLE [employer]MENT [employer]: [employer]  Issues with therapy: ISSUES WITH THERAPY: None  Benefits with PAP: PERCEIVED BENEFITS OF PAP: refreshing sleep    PAP Adherence  A PAP adherence download was obtained and data was reviewed personally today in clinic.      RLS Followup:   none.    Daytime Symptoms    Patient reports DAYTIME SYMPTOMS: excessively sleepy during the day    Naps: No  Fatigue: denies feeling fatigue    ESS: 17  LUCY: 10  FOSQ: 34    REVIEW OF SYSTEMS     REVIEW OF SYSTEMS  Review of Systems   Constitutional: Negative.    HENT: Negative.     Respiratory: Negative.     Cardiovascular: Negative.    Genitourinary: Negative.    Skin: Negative.    Neurological: Negative.    Psychiatric/Behavioral: Negative.           ALLERGIES AND MEDICATIONS     ALLERGIES  No Known Allergies    MEDICATIONS: He has a current  medication list which includes the following prescription(s): ibuprofen - Take by mouth, centrum - Take by mouth, polyethylene glycol - Drink 1/2 starting at 6 pm the night before your procedure then drink the 2nd 1/2 5 hours before procedure arrival time (Patient not taking: Reported on 1/9/2025), and tadalafil - Take by mouth (Patient not taking: Reported on 1/9/2025).    PAST MEDICAL HISTORY : He  has a past medical history of Personal history of colonic polyps, Personal history of other diseases of the musculoskeletal system and connective tissue, and Personal history of other diseases of the nervous system and sense organs.    PAST SURGICAL HISTORY: He  has a past surgical history that includes Other surgical history (06/08/2022) and Other surgical history (06/08/2022).     FAMILY HISTORY: No changes since previous visit. Otherwise non-contributory as charted.     SOCIAL HISTORY  He  reports that he has never smoked. He has never used smokeless tobacco. He reports that he does not currently use alcohol after a past usage of about 14.0 standard drinks of alcohol per week. He reports that he does not use drugs.       PHYSICAL EXAM     VITAL SIGNS: /74   Pulse 80   Temp 36.7 °C (98 °F)   Wt 73.9 kg (163 lb)   SpO2 98% Comment: RA  BMI 23.73 kg/m²      PREVIOUS WEIGHTS:  Wt Readings from Last 3 Encounters:   01/09/25 73.9 kg (163 lb)   08/29/24 68.5 kg (151 lb)   05/02/24 66.4 kg (146 lb 6.4 oz)         RESULTS/DATA     Bicarbonate (mmol/L)   Date Value   04/26/2024 33 (H)   06/08/2022 31

## 2025-01-10 NOTE — ASSESSMENT & PLAN NOTE
Excessive daytime sleepiness is likely due to lack of sleep    Emphasized the need for adequate sleep

## 2025-01-10 NOTE — ASSESSMENT & PLAN NOTE
- doing well with PAP therapy, but could use sleep and usage extension  - continue current setting  - renew PAP supply orders